# Patient Record
Sex: FEMALE | Race: WHITE
[De-identification: names, ages, dates, MRNs, and addresses within clinical notes are randomized per-mention and may not be internally consistent; named-entity substitution may affect disease eponyms.]

---

## 2020-01-23 ENCOUNTER — HOSPITAL ENCOUNTER (OUTPATIENT)
Dept: HOSPITAL 84 - D.OPS | Age: 85
Discharge: HOME | End: 2020-01-23
Attending: ORTHOPAEDIC SURGERY
Payer: MEDICARE

## 2020-01-23 VITALS — WEIGHT: 109 LBS | BODY MASS INDEX: 18.61 KG/M2 | HEIGHT: 64 IN | BODY MASS INDEX: 18.61 KG/M2

## 2020-01-23 VITALS — SYSTOLIC BLOOD PRESSURE: 169 MMHG | DIASTOLIC BLOOD PRESSURE: 74 MMHG

## 2020-01-23 DIAGNOSIS — G56.01: ICD-10-CM

## 2020-01-23 DIAGNOSIS — S52.532A: Primary | ICD-10-CM

## 2020-01-23 DIAGNOSIS — I10: ICD-10-CM
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
## 2020-01-23 LAB
ANION GAP SERPL CALC-SCNC: 10.7 MMOL/L (ref 8–16)
BASOPHILS NFR BLD AUTO: 0.3 % (ref 0–2)
BUN SERPL-MCNC: 16 MG/DL (ref 7–18)
CALCIUM SERPL-MCNC: 8.8 MG/DL (ref 8.5–10.1)
CHLORIDE SERPL-SCNC: 99 MMOL/L (ref 98–107)
CO2 SERPL-SCNC: 30.7 MMOL/L (ref 21–32)
CREAT SERPL-MCNC: 1 MG/DL (ref 0.6–1.3)
EOSINOPHIL NFR BLD: 0.8 % (ref 0–7)
ERYTHROCYTE [DISTWIDTH] IN BLOOD BY AUTOMATED COUNT: 12.4 % (ref 11.5–14.5)
GLUCOSE SERPL-MCNC: 95 MG/DL (ref 74–106)
HCT VFR BLD CALC: 40.2 % (ref 36–48)
HGB BLD-MCNC: 13 G/DL (ref 12–16)
IMM GRANULOCYTES NFR BLD: 0.6 % (ref 0–5)
LYMPHOCYTES NFR BLD AUTO: 17.5 % (ref 15–50)
MCH RBC QN AUTO: 30.9 PG (ref 26–34)
MCHC RBC AUTO-ENTMCNC: 32.3 G/DL (ref 31–37)
MCV RBC: 95.5 FL (ref 80–100)
MONOCYTES NFR BLD: 9.3 % (ref 2–11)
NEUTROPHILS NFR BLD AUTO: 71.5 % (ref 40–80)
OSMOLALITY SERPL CALC.SUM OF ELEC: 272 MOSM/KG (ref 275–300)
PLATELET # BLD: 298 10X3/UL (ref 130–400)
PMV BLD AUTO: 9.8 FL (ref 7.4–10.4)
POTASSIUM SERPL-SCNC: 4.4 MMOL/L (ref 3.5–5.1)
RBC # BLD AUTO: 4.21 10X6/UL (ref 4–5.4)
SODIUM SERPL-SCNC: 136 MMOL/L (ref 136–145)
WBC # BLD AUTO: 7.8 10X3/UL (ref 4.8–10.8)

## 2020-01-26 NOTE — OP
PATIENT NAME:  NEERAJ MONTEJO                         MEDICAL RECORD: [id number]
:[birth year]                                             LOCATION:D.OPS          
                                                         ADMISSION DATE:        
SURGEON:  ARNOLD ZULUAGA MD        
 
 
DATE OF OPERATION:  2020
 
PREOPERATIVE DIAGNOSES:
1.  Displaced distal radius fracture.
2.  Acute carpal tunnel syndrome.
 
POSTOPERATIVE DIAGNOSES:
1.  Displaced distal radius fracture.
2.  Acute carpal tunnel syndrome.
 
PROCEDURE:
1.  Open reduction and internal fixation of the patient's right distal radius
fracture.
2.  Right carpal tunnel release.
 
SURGEON:  Arnold Zuluaga MD
 
ASSISTANT:  IZABELA Skelton.
 
INTRAOPERATIVE COMPLICATIONS:  None.
 
SUMMARY OF PATHOLOGIC FINDINGS:  Consistent with preoperative radiographs and
diagnosis, the patient had fallen back into a deformity.  It was unacceptable
after attempted closed reduction was done in the office.  The patient also had a
very tight transverse carpal ligament hematoma into the median nerve canal
consistent with the preoperative symptoms.  She warranted carpal tunnel release.
 
OPERATIVE SUMMARY IN DETAIL:  After obtaining the appropriate preoperative
orthopedic surgery consent as well as anesthetic consultation, evaluation, and
clearance, the patient was brought to the operating room and placed on the
operating table in supine position.  After general laryngeal mask airway was
administered, tourniquet was placed on the proximal aspect of the right upper
extremity.  Right upper extremity was then prepped and draped in routine sterile
fashion.  At this point, the appropriate timeout was taken and agreed upon by
all given the patient's unique identifiers.  Arm was elevated and exsanguinated,
tourniquet inflated to 250 mmHg.  Traction-countertraction reduction maneuver
was performed and a single fluoroscopy restored the anatomic radial angle
inclination, volar tilt, and radial length.  Curvilinear incision was made in
keeping with a Mario's volar approach.  This was taken down at the F[facility] to
identify the landmarks and then gentle complete transverse carpal ligament was
done with the Ligonier protection of the median nerve.  Having completed complete
carpal tunnel release, dissection was carried down further to the fracture
itself.  Having cleaned away all of the muscle belly, the plate was put into
place under fluoroscopic guidance.  It was held provisionally with K wires
through the plate and then a serial drill and fill was used with both
compression and locking screws.  This again was all done under fluoroscopic
guidance.  Final radiographs were taken and submitted for radiologist review. 
Excellent anatomic restoration of the wrist had been achieved.  The wound was
then closed with 2-0 Vicryl followed by 4-0 Prolene in running fashion by IZABELA Skelton.  Tourniquet was deflated.  Sterile dressings were applied.  Volar
splint was applied.  The patient was awakened and taken to the recovery room in
stable condition.  All final needle and sponge counts were correct.
 
 
 
OPERATIVE REPORT                               [id number]    NEERAJ MONTEJO [place]       
 
 
 
TRANSINT:[id number] Confirmation ID: [id number] DOCUMENT ID: [id number]
                                           
                                           MARGARETH NORRIS, ARNOLD ROMERO        
 
 
 
Electronically Signed by ARNOLD ZULUAGA MD on 20 at 1002
 
 
 
 
 
 
 
 
 
 
 
 
 
 
 
 
 
 
 
 
 
 
 
 
 
 
 
 
 
 
 
 
 
 
 
 
 
 
 
CC:                                                             [id number]
DICTATION DATE: 20 1123     :     20 1518      [facility] 
                                                                      20
[id number] [place] [address]